# Patient Record
Sex: MALE | Race: WHITE | URBAN - METROPOLITAN AREA
[De-identification: names, ages, dates, MRNs, and addresses within clinical notes are randomized per-mention and may not be internally consistent; named-entity substitution may affect disease eponyms.]

---

## 2018-10-26 ENCOUNTER — OFFICE VISIT (OUTPATIENT)
Dept: FAMILY MEDICINE CLINIC | Age: 23
End: 2018-10-26

## 2018-10-26 VITALS
TEMPERATURE: 98.2 F | SYSTOLIC BLOOD PRESSURE: 122 MMHG | BODY MASS INDEX: 35.47 KG/M2 | WEIGHT: 267.6 LBS | HEIGHT: 73 IN | DIASTOLIC BLOOD PRESSURE: 83 MMHG | OXYGEN SATURATION: 98 % | HEART RATE: 76 BPM | RESPIRATION RATE: 16 BRPM

## 2018-10-26 DIAGNOSIS — S44.91XA NEURAPRAXIA OF RIGHT UPPER EXTREMITY, INITIAL ENCOUNTER: Primary | ICD-10-CM

## 2018-10-26 DIAGNOSIS — R20.2 PARESTHESIA OF RIGHT UPPER LIMB: ICD-10-CM

## 2018-10-26 DIAGNOSIS — Z76.89 ENCOUNTER TO ESTABLISH CARE WITH NEW DOCTOR: ICD-10-CM

## 2018-10-26 NOTE — PROGRESS NOTES
[address] [facility]    Subjective:   Linette Ferro is a 20 y.o. male  CC: numbness and tingling of RUE  History provided by patient     HPI:  Pt reports 3 weeks hx of paresthesia of RUE. Reports paresthesias that lasts 5min, relieved by shaking RUE. Reports that numbness and tingling are waking him up at night, about 3 times a night in the last week. Symptoms occur intermittently during the day when his hands are above shoulder level and working. However hands above shoulder level do not always reproduce symptoms. Symptoms not associated with pain. He denies previous trauma. Denies similar occurrence in the past.  Denies known tick bites and hx of lyme disease. Pt is  and also plays drums. Pt denies fever, HA, dizziness/lightheadedness, SOB, CP, Abd pain, N/V/D and weight loss. Denies paresthesias in any other extremity. Establish care: no previous PCP    No current outpatient medications on file prior to visit. No current facility-administered medications on file prior to visit. There is no problem list on file for this patient.       Social History     Socioeconomic History    Marital status:      Spouse name: Not on file    Number of children: Not on file    Years of education: Not on file    Highest education level: Not on file   Social Needs    Financial resource strain: Not on file    Food insecurity - worry: Not on file    Food insecurity - inability: Not on file    Transportation needs - medical: Not on file   [email or web address] needs - non-medical: Not on file   Occupational History    Not on file   Tobacco Use    Smoking status: Never Smoker    Smokeless tobacco: Current User    Tobacco comment: 1 1/2 Cans of Dip Per Day    Substance and Sexual Activity    Alcohol use: Yes     Comment: Socially ETOH Use    Drug use: No    Sexual activity: Yes     Partners: Female     Birth control/protection: None   Other Topics Concern    Not on file Social History Narrative    Not on file       ROS: See HPI      Objective:     Visit Vitals  /83 (BP 1 Location: Left arm, BP Patient Position: Sitting)   Pulse 76   Temp 98.2 °F (36.8 °C) (Oral)   Resp 16   Ht 6' 1\" (1.854 m)   Wt 267 lb 9.6 oz (121.4 kg)   SpO2 98%   BMI 35.31 kg/m²        Physical Exam   Constitutional: He is oriented to person, place, and time. He appears well-developed and well-nourished. No distress. HENT:   Head: Normocephalic and atraumatic. Neck: Normal range of motion. Cardiovascular: Normal rate, regular rhythm, intact distal pulses and normal pulses. Pulmonary/Chest: Effort normal and breath sounds normal. No respiratory distress. He has no wheezes. He has no rales. He exhibits no tenderness. Neurological: He is alert and oriented to person, place, and time. He has normal strength and normal reflexes. No cranial nerve deficit. Reflex Scores:       Tricep reflexes are 2+ on the right side and 2+ on the left side. Bicep reflexes are 2+ on the right side and 2+ on the left side. Brachioradialis reflexes are 2+ on the right side and 2+ on the left side. Patellar reflexes are 2+ on the right side and 2+ on the left side. Achilles reflexes are 2+ on the right side and 2+ on the left side. Positive Tinel's sign. Strength 5/5 in bilateral UE and LE. Decreased sensation in RUE (C5, C6, C7, C8 and T1 dermatomes)     Skin: He is not diaphoretic. Assessment and orders:       ICD-10-CM ICD-9-CM    1. Neurapraxia of right upper extremity, initial encounter S44. 91XA 955.9    2. Paresthesia of right upper limb R20.2 782.0    3. Encounter to establish care with new doctor Z76.89 V65.8      Neurapraxia of RUE: intermittent, likely 2/2 to positioning; paresthesias in the hand likely 2/2 carpal tunnel syndrome. Pos tinel's sign on exam. Herniated disc/ spinal stenosis unlikely.    - wrist splints given in clinic and advised to use daily at night.   - advised to avoid positions/activity that reproduce symptoms  - f/u in 2 weeks if symptoms do not resolve. I have reviewed patient medical and social history and medications. I have reviewed pertinent labs results and other data. I have discussed the diagnosis with the patient and the intended plan as seen in the above orders. The patient has received an after-visit summary and questions were answered concerning future plans. I have discussed medication side effects and warnings with the patient as well.     Elio Rodriguez MD  [address] [place] [address] Resident   10/26/18    Patient discussed and seen with Dr. Mary Beth Castle, Attending Physician

## 2018-10-26 NOTE — PROGRESS NOTES
Identified Patient with two Patient identifiers (Name and ). Two Patient Identifiers confirmed. Reviewed record in preparation for visit and have obtained necessary documentation. Chief Complaint   Patient presents with    Numbness     Right Arm Numbnes when elevated 2 to 3 times per night x 3 Weeks. Mid thigh down right leg numbness when sitting after 5 minutes       Visit Vitals  /83 (BP 1 Location: Left arm, BP Patient Position: Sitting)   Pulse 76   Temp 98.2 °F (36.8 °C) (Oral)   Resp 16   Ht 6' 1\" (1.854 m)   Wt 267 lb 9.6 oz (121.4 kg)   SpO2 98%   BMI 35.31 kg/m²       1. Have you been to the ER, urgent care clinic since your last visit? Hospitalized since your last visit? No    2. Have you seen or consulted any other health care providers outside of the [address] [place] [address] since your last visit? Include any pap smears or colon screening.  No